# Patient Record
(demographics unavailable — no encounter records)

---

## 2025-06-08 NOTE — END OF VISIT
[Time Spent: ___ minutes] : I have spent [unfilled] minutes of time on the encounter which excludes teaching and separately reported services. [FreeTextEntry3] : The complete time calculation (  37 minutes) for this patient encounter, which excludes teaching and separately reported services, but includes a review of the patient's past medical records pertinent to his chief complaint, including medical, and surgical history, review of medications taken and of previous visits with other health care providers. In addition to the time spent with the patient, the total time calculation also includes the time necessary to document all of the information gathered during this session into the patient's electronic health records.

## 2025-06-08 NOTE — HISTORY OF PRESENT ILLNESS
[FreeTextEntry1] : 70M w/ ED for 10-month post-op f/u appt, via telemedicine from [place]  - s/p RR of IPP reservoir only (removed 125cc Coloplast and replaced w/ AMS 100cc Conceal) on 8/2/24 s/p IPP on 7/12/24  - He reports having knee stiffness s/p recent knee surgery He reports doing well overall He is very happy with his implant overall and implant size He states it is a bit harder to reach orgasm with IPP  He reports he doesn't inflate it all the way when he has sex with his boyfriend as this feels more comfortable for him He reports reservoir has settled and bulge does not bother him "it's a non-issue for me" He reports urinary incontinence remains an issue for him - he can go days without leaking, but at times if he has to urinate and can't find a bathroom in time, he may leak.  States his PSA has remained undetectable  Works as   He would be interested in a future AUS but he mentions he had 5 major surgeries in the last 4 years and wants a break from more surgery  Both shoulders need surgery (i.e. 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

## 2025-06-08 NOTE — PLAN
[TextEntry] : A/P, 70M w/ ED for 10-month post-op f/u appt, via [place]medicine from [place] - s/p RR of IPP reservoir only (removed 125cc Coloplast and replaced w/ AMS 100cc Conceal) on 8/2/24 - s/p IPP on 7/12/24 - doing well overall - for urinary incontinence, discussed sling vs. artificial urinary sphincter (AUS). He states at some point he may want to have that done  -discussed pros and cons of a urethral sling vc AUS, including a discussion of possible risks and complications of each option - the patient wishes to proceed with shoulder surgery first and AUS at later date. He will contact us when he is ready to schedule his urological procedure.  - f/u 1 year

## 2025-06-08 NOTE — REASON FOR VISIT
[Telehealth (audio & video)] : This visit was provided via telehealth using real-time 2-way audio visual technology.